# Patient Record
Sex: FEMALE | Race: WHITE | NOT HISPANIC OR LATINO | Employment: UNEMPLOYED | URBAN - METROPOLITAN AREA
[De-identification: names, ages, dates, MRNs, and addresses within clinical notes are randomized per-mention and may not be internally consistent; named-entity substitution may affect disease eponyms.]

---

## 2022-05-09 ENCOUNTER — OFFICE VISIT (OUTPATIENT)
Dept: OBSTETRICS AND GYNECOLOGY | Facility: CLINIC | Age: 42
End: 2022-05-09
Payer: COMMERCIAL

## 2022-05-09 VITALS
BODY MASS INDEX: 24.03 KG/M2 | HEIGHT: 60 IN | DIASTOLIC BLOOD PRESSURE: 70 MMHG | WEIGHT: 122.38 LBS | SYSTOLIC BLOOD PRESSURE: 98 MMHG

## 2022-05-09 DIAGNOSIS — Z12.4 SCREENING FOR CERVICAL CANCER: ICD-10-CM

## 2022-05-09 DIAGNOSIS — Z12.31 ENCOUNTER FOR SCREENING MAMMOGRAM FOR BREAST CANCER: ICD-10-CM

## 2022-05-09 DIAGNOSIS — Z80.3 FAMILY HISTORY OF BREAST CANCER: ICD-10-CM

## 2022-05-09 DIAGNOSIS — Z01.419 WELL WOMAN EXAM WITH ROUTINE GYNECOLOGICAL EXAM: Primary | ICD-10-CM

## 2022-05-09 PROCEDURE — 99999 PR PBB SHADOW E&M-NEW PATIENT-LVL III: CPT | Mod: PBBFAC,,, | Performed by: OBSTETRICS & GYNECOLOGY

## 2022-05-09 PROCEDURE — 99999 PR PBB SHADOW E&M-NEW PATIENT-LVL III: ICD-10-PCS | Mod: PBBFAC,,, | Performed by: OBSTETRICS & GYNECOLOGY

## 2022-05-09 PROCEDURE — 99386 PR PREVENTIVE VISIT,NEW,40-64: ICD-10-PCS | Mod: S$GLB,,, | Performed by: OBSTETRICS & GYNECOLOGY

## 2022-05-09 PROCEDURE — 87624 HPV HI-RISK TYP POOLED RSLT: CPT | Performed by: OBSTETRICS & GYNECOLOGY

## 2022-05-09 PROCEDURE — 88175 CYTOPATH C/V AUTO FLUID REDO: CPT | Performed by: OBSTETRICS & GYNECOLOGY

## 2022-05-09 PROCEDURE — 99386 PREV VISIT NEW AGE 40-64: CPT | Mod: S$GLB,,, | Performed by: OBSTETRICS & GYNECOLOGY

## 2022-05-09 NOTE — PROGRESS NOTES
GYNECOLOGY OFFICE NOTE    Reason for visit: annual    HPI: Pt is a 42 y.o.  female  who presents for annual. Menarche: 12. Cycle: Interval-  Q month, Duration- 5 days, Flow- normal (changing tampon  4-5 times a day. - tapers), denies dysmenorrhea. She is sexually active.  She uses bilateral tubal ligation for contraception.  She does not desire STI screening. She denies vaginal discharge.  Last pap: 5yrs, denies hx of abnormal. Last MMG : never    History reviewed. No pertinent past medical history.    Past Surgical History:   Procedure Laterality Date    TUBAL LIGATION         Family History   Problem Relation Age of Onset    Breast cancer Paternal Grandmother     Breast cancer Paternal Aunt     Breast cancer Paternal Cousin     Colon cancer Neg Hx     Ovarian cancer Neg Hx        Social History     Tobacco Use    Smoking status: Never Smoker    Smokeless tobacco: Never Used   Substance Use Topics    Alcohol use: Yes    Drug use: Never       OB History    Para Term  AB Living   3 3 3     3   SAB IAB Ectopic Multiple Live Births           3      # Outcome Date GA Lbr Farooq/2nd Weight Sex Delivery Anes PTL Lv   3 Term      Vag-Spont   NAZ   2 Term      Vag-Spont   NAZ   1 Term      Vag-Spont   NAZ       No current outpatient medications on file.     No current facility-administered medications for this visit.       Allergies: Patient has no known allergies.     BP 98/70   Ht 5' (1.524 m)   Wt 55.5 kg (122 lb 5.7 oz)   LMP 2022 (Approximate)   BMI 23.90 kg/m²     ROS:  GENERAL: Denies fever or chills.   SKIN: Denies rash or lesions.   HEAD: Denies head injury or headache.   CHEST: Denies chest pain or shortness of breath.   CARDIOVASCULAR: Denies palpitations or chest pain.   ABDOMEN: No constipation, diarrhea, nausea, vomiting or rectal bleeding.   URINARY: No dysuria, hematuria, or burning on urination.  REPRODUCTIVE: See HPI.   BREASTS: see HPI  NEUROLOGIC: Denies  syncope or weakness.     Physical Exam:  GENERAL: alert, appears stated age and cooperative  NEUROLOGIC: orientated to person, place and time, normal mood and affect   CHEST: Normal respiratory effort  NECK: normal appearance  SKIN: no acne, hirsutism  BREAST EXAM: breasts appear normal, no suspicious masses, no skin or nipple changes or axillary nodes  ABDOMEN: abdomen is soft without significant tenderness, masses  EXTERNAL GENITALIA:  normal general appearance  URETHRA: normal urethra, normal urethral meatus  VAGINA:  normal mucosa, no  lesions  CERVIX:  Normal  UTERUS:  mobile  ADNEXA: nontender    Diagnosis:  1. Well woman exam with routine gynecological exam    2. Screening for cervical cancer    3. Encounter for screening mammogram for breast cancer    4. Family history of breast cancer        Plan:   1. Annual  2. Pap/hpv today  3. MMG  4. Referral placed    Orders Placed This Encounter    HPV High Risk Genotypes, PCR    Mammo Digital Screening Bilat w/ Ian    Liquid-Based Pap Smear, Screening    [facility] Request         Yesenia Dale MD  OB/GYN

## 2022-05-10 ENCOUNTER — TELEPHONE (OUTPATIENT)
Dept: ADMINISTRATIVE | Facility: OTHER | Age: 42
End: 2022-05-10
Payer: COMMERCIAL

## 2022-06-02 ENCOUNTER — HOSPITAL ENCOUNTER (OUTPATIENT)
Dept: RADIOLOGY | Facility: HOSPITAL | Age: 42
Discharge: HOME | End: 2022-06-02
Attending: OBSTETRICS & GYNECOLOGY
Payer: COMMERCIAL

## 2022-06-02 DIAGNOSIS — Z12.31 ENCOUNTER FOR SCREENING MAMMOGRAM FOR BREAST CANCER: ICD-10-CM

## 2022-06-02 PROCEDURE — 77067 SCR MAMMO BI INCL CAD: CPT | Mod: 26,,, | Performed by: RADIOLOGY

## 2022-06-02 PROCEDURE — 77063 MAMMO DIGITAL SCREENING BILAT WITH TOMO: ICD-10-PCS | Mod: 26,,, | Performed by: RADIOLOGY

## 2022-06-02 PROCEDURE — 77067 SCR MAMMO BI INCL CAD: CPT | Mod: TC

## 2022-06-02 PROCEDURE — 77067 MAMMO DIGITAL SCREENING BILAT WITH TOMO: ICD-10-PCS | Mod: 26,,, | Performed by: RADIOLOGY

## 2022-06-02 PROCEDURE — 77063 BREAST TOMOSYNTHESIS BI: CPT | Mod: 26,,, | Performed by: RADIOLOGY

## 2022-06-02 PROCEDURE — 77063 BREAST TOMOSYNTHESIS BI: CPT | Mod: TC

## 2022-09-12 ENCOUNTER — PATIENT MESSAGE (OUTPATIENT)
Dept: OBSTETRICS AND GYNECOLOGY | Facility: CLINIC | Age: 42
End: 2022-09-12
Payer: COMMERCIAL

## 2022-09-13 NOTE — TELEPHONE ENCOUNTER
Pt is complaining of irregular bleeding. No new meds, some weight fluctuations. Her cycle started last Thursday, was so heavy she bled through super tampons, changing every hour, but it was just for a day. Pt still spotting today. Pt is complaining of extreme fatigue (  since the bleeding) and is still having cramping.     Malou jorgensen advise

## 2022-09-14 DIAGNOSIS — N92.0 MENORRHAGIA WITH REGULAR CYCLE: Primary | ICD-10-CM

## 2022-09-14 RX ORDER — FERROUS SULFATE 325(65) MG
325 TABLET, DELAYED RELEASE (ENTERIC COATED) ORAL DAILY
Qty: 30 TABLET | Refills: 3 | Status: SHIPPED | OUTPATIENT
Start: 2022-09-14 | End: 2023-03-13

## 2022-09-22 ENCOUNTER — HOSPITAL ENCOUNTER (OUTPATIENT)
Dept: RADIOLOGY | Facility: HOSPITAL | Age: 42
Discharge: HOME | End: 2022-09-22
Attending: OBSTETRICS & GYNECOLOGY
Payer: COMMERCIAL

## 2022-09-22 DIAGNOSIS — N92.0 MENORRHAGIA WITH REGULAR CYCLE: ICD-10-CM

## 2022-09-22 PROCEDURE — 76830 TRANSVAGINAL US NON-OB: CPT | Mod: 26,,, | Performed by: RADIOLOGY

## 2022-09-22 PROCEDURE — 76830 TRANSVAGINAL US NON-OB: CPT | Mod: TC

## 2022-09-22 PROCEDURE — 76830 US PELVIS COMP WITH TRANSVAG NON-OB (XPD): ICD-10-PCS | Mod: 26,,, | Performed by: RADIOLOGY

## 2022-09-22 PROCEDURE — 76856 US PELVIS COMP WITH TRANSVAG NON-OB (XPD): ICD-10-PCS | Mod: 26,,, | Performed by: RADIOLOGY

## 2022-09-22 PROCEDURE — 76856 US EXAM PELVIC COMPLETE: CPT | Mod: 26,,, | Performed by: RADIOLOGY

## 2022-09-24 ENCOUNTER — TELEPHONE (OUTPATIENT)
Dept: OBSTETRICS AND GYNECOLOGY | Facility: CLINIC | Age: 42
End: 2022-09-24
Payer: COMMERCIAL

## 2022-09-24 NOTE — TELEPHONE ENCOUNTER
----- Message from Yesenia Dale MD sent at 9/22/2022  3:26 PM CDT -----  Pt can be seen as a telemed to discuss US and treatment options.

## 2022-10-10 ENCOUNTER — PATIENT MESSAGE (OUTPATIENT)
Dept: OBSTETRICS AND GYNECOLOGY | Facility: CLINIC | Age: 42
End: 2022-10-10
Payer: COMMERCIAL

## 2022-10-13 ENCOUNTER — OFFICE VISIT (OUTPATIENT)
Dept: OBSTETRICS AND GYNECOLOGY | Facility: CLINIC | Age: 42
End: 2022-10-13
Payer: COMMERCIAL

## 2022-10-13 DIAGNOSIS — Z71.2 ENCOUNTER TO DISCUSS TEST RESULTS: Primary | ICD-10-CM

## 2022-10-13 PROCEDURE — 99212 OFFICE O/P EST SF 10 MIN: CPT | Mod: 95,,, | Performed by: OBSTETRICS & GYNECOLOGY

## 2022-10-13 PROCEDURE — 99212 PR OFFICE/OUTPT VISIT, EST, LEVL II, 10-19 MIN: ICD-10-PCS | Mod: 95,,, | Performed by: OBSTETRICS & GYNECOLOGY

## 2022-10-13 NOTE — PROGRESS NOTES
GYNECOLOGY OFFICE NOTE    Reason for visit: test results    HPI: Pt is a 42 y.o.  female  who presents for evaluation of irregular bleeding. Reports last month- had heavy bleeding, changing Q hr for 1 day with associated LLQ pain. Cycle this month was back to normal flow.    Pelvic 2022:  Uterus:Size: 8.5 x 5.5 x 6.9 cm  Masses: None  Endometrium: Heterogeneous in this pre menopausal patient, measuring 12 mm.  Right ovary: Size: 2.2 x 2.2 x 1.7 cm Appearance: Unremarkable Vascular flow: Normal.  Left ovary: Size: 2.7 x 2.8 x 1.2 cm Appearance: Unremarkable Vascular Flow: Normal.  Free Fluid: Trace amount of free fluid is seen in the cul de sac.  Impression:Heterogeneous appearance of the endometrium without evidence for definite solid lesion or hypervascularity.  This may be secondary to phase of menstrual cycle.  Correlation advised. Trace amount of free fluid in the cul de sac which may be physiologic.    No past medical history on file.    Past Surgical History:   Procedure Laterality Date    TUBAL LIGATION         Family History   Problem Relation Age of Onset    Breast cancer Paternal Grandmother     Breast cancer Paternal Aunt     Breast cancer Paternal Cousin     Colon cancer Neg Hx     Ovarian cancer Neg Hx        Social History     Tobacco Use    Smoking status: Never    Smokeless tobacco: Never   Substance Use Topics    Alcohol use: Yes    Drug use: Never       OB History    Para Term  AB Living   3 3 3     3   SAB IAB Ectopic Multiple Live Births           3      # Outcome Date GA Lbr Farooq/2nd Weight Sex Delivery Anes PTL Lv   3 Term      Vag-Spont   NAZ   2 Term      Vag-Spont   NAZ   1 Term      Vag-Spont   NAZ       Current Outpatient Medications   Medication Sig    ferrous sulfate 325 (65 FE) MG EC tablet Take 1 tablet (325 mg total) by mouth once daily.     No current facility-administered medications for this visit.       Allergies: Patient has no known allergies.      There were no vitals taken for this visit.      Physical Exam: Limited by telemed    Diagnosis:  1. Encounter to discuss test results        Plan:   1.:  we discussed work-up if next cycle is irregular and common treatment options. Pt will keep me updated           Face to Face time with patient: 15 minutes of total time spent on the encounter, which includes face to face time and non-face to face time preparing to see the patient (eg, review of tests), Obtaining and/or reviewing separately obtained history, Documenting clinical information in the electronic or other health record, Independently interpreting results (not separately reported) and communicating results to the patient/family/caregiver, or Care coordination (not separately reported).         Yesenia Dale MD  OB/GYN

## 2024-12-12 DIAGNOSIS — Z12.31 SCREENING MAMMOGRAM, ENCOUNTER FOR: Primary | ICD-10-CM

## 2024-12-16 ENCOUNTER — HOSPITAL ENCOUNTER (OUTPATIENT)
Dept: RADIOLOGY | Facility: HOSPITAL | Age: 44
Discharge: HOME | End: 2024-12-16
Attending: FAMILY MEDICINE
Payer: COMMERCIAL

## 2024-12-16 DIAGNOSIS — Z12.31 SCREENING MAMMOGRAM, ENCOUNTER FOR: ICD-10-CM

## 2024-12-16 PROCEDURE — 77063 BREAST TOMOSYNTHESIS BI: CPT | Mod: 26,,, | Performed by: RADIOLOGY

## 2024-12-16 PROCEDURE — 77067 SCR MAMMO BI INCL CAD: CPT | Mod: 26,,, | Performed by: RADIOLOGY

## 2024-12-16 PROCEDURE — 77063 BREAST TOMOSYNTHESIS BI: CPT | Mod: TC
